# Patient Record
(demographics unavailable — no encounter records)

---

## 2024-11-07 NOTE — HISTORY OF PRESENT ILLNESS
[FreeTextEntry1] : CC: Raynoud syndrome   PMD Dimitris Willard [facility] , [facility]  Fax    HPI: 20 y old F with PMH of HLD, Thyroid cyst with imaging, Symptoms of changing of color of feet and hands with cold exposure , no wounds no skin lesions, no joint pain no swelling, no stiffness, no recurrent skin rash , no rash with sun exposure, thinks once had rash anterior chest with sun but never after that no oral ulcers, no history of uveitis or scleritis, no hair loss , no dysphagia no abd pain, no blood in urine or stool no recurrent infections  usually symptoms worse with extreme cold weather - like skiing  no limitation with exercise   with workup from PMD normal JOSE checked 2023   FH: no known family history of autoimmune disease  SH : no toxic habits, college student   ALL NKDA

## 2024-11-07 NOTE — ASSESSMENT
[FreeTextEntry1] : A  and P  20 y old F with recently diagnosed HLD diet controlled, also thyroid cyst followed by benign, referred by PMD , last physical no other medical conditions , TSH normal , with bl hand and foot color change pale and then bluish discoloration Raynaud's syndrome possible no other associated symptoms to suggest connective tissue disease discussed with the patient  -with evaluation 2023 JOSE was negative  -reviewed last note and records from PMD, will send to us by email to be uploaded in the system  -as no other associated symptoms concerning for autoimmune disease and JOSE was negative unlikely due to systemic autoimmune convective tissue disease -advised to keep hands and feet worm with extreme cold temperature use warmers and double glove  -if new symptoms as discussed to follow up sooner otherwise follow up next year for re evaluation

## 2024-11-07 NOTE — PHYSICAL EXAM
[General Appearance - In No Acute Distress] : in no acute distress [Sclera] : the sclera and conjunctiva were normal [PERRL With Normal Accommodation] : pupils were equal in size, round, and reactive to light [Extraocular Movements] : extraocular movements were intact [Outer Ear] : the ears and nose were normal in appearance [Hearing Threshold Finger Rub Not [place]] : hearing was normal [Both Tympanic Membranes Were Examined] : both tympanic membranes were normal [] : the neck was supple [Respiration, Rhythm And Depth] : normal respiratory rhythm and effort [Auscultation Breath Sounds / Voice Sounds] : lungs were clear to auscultation bilaterally [Chest Palpation] : palpation of the chest revealed no abnormalities [Apical Impulse] : the apical impulse was normal [Heart Rate And Rhythm] : heart rate was normal and rhythm regular [Heart Sounds] : normal S1 and S2 [Edema] : there was no peripheral edema [Bowel Sounds] : normal bowel sounds [Abdomen Soft] : soft [Abdomen Tenderness] : non-tender [No Spinal Tenderness] : no spinal tenderness [Nail Clubbing] : no clubbing  or cyanosis of the fingernails [Musculoskeletal - Swelling] : no joint swelling seen [Motor Tone] : muscle strength and tone were normal